# Patient Record
Sex: MALE | Race: BLACK OR AFRICAN AMERICAN
[De-identification: names, ages, dates, MRNs, and addresses within clinical notes are randomized per-mention and may not be internally consistent; named-entity substitution may affect disease eponyms.]

---

## 2017-11-30 ENCOUNTER — HOSPITAL ENCOUNTER (EMERGENCY)
Dept: HOSPITAL 62 - ER | Age: 27
Discharge: HOME | End: 2017-11-30
Payer: SELF-PAY

## 2017-11-30 VITALS — DIASTOLIC BLOOD PRESSURE: 84 MMHG | SYSTOLIC BLOOD PRESSURE: 126 MMHG

## 2017-11-30 DIAGNOSIS — N50.82: ICD-10-CM

## 2017-11-30 DIAGNOSIS — A64: ICD-10-CM

## 2017-11-30 DIAGNOSIS — F17.200: ICD-10-CM

## 2017-11-30 DIAGNOSIS — R36.9: ICD-10-CM

## 2017-11-30 DIAGNOSIS — N45.1: Primary | ICD-10-CM

## 2017-11-30 DIAGNOSIS — N50.89: ICD-10-CM

## 2017-11-30 PROCEDURE — 76870 US EXAM SCROTUM: CPT

## 2017-11-30 PROCEDURE — 93976 VASCULAR STUDY: CPT

## 2017-11-30 PROCEDURE — 96372 THER/PROPH/DIAG INJ SC/IM: CPT

## 2017-11-30 PROCEDURE — 99283 EMERGENCY DEPT VISIT LOW MDM: CPT

## 2017-11-30 NOTE — RADIOLOGY REPORT (SQ)
EXAM DESCRIPTION:  U/S SCROTUM W/DOPPLER



COMPLETED DATE/TIME:  11/30/2017 4:01 am



REASON FOR STUDY:  scrotal pain



COMPARISON:  None.



TECHNIQUE:  Static and realtime gray scale imaging of the scrotum and testes.  Selected color Doppler
 and spectral images recorded to document blood flow.



LIMITATIONS:  None.



FINDINGS:  RIGHT:

TESTICLE: Measure 4.0 x 2.6 x 2.2 cm. Normal echotexture.  Normal blood flow.

EPIDIDYMIS: Measures 1.2 x 1.3 x 0.8 cm.  The epididymis appears edematous with heterogeneous echotex
ture and increased vascular flow.

HYDROCELE OR VARICOCELE: Moderate hydrocele with mobile debris.  No varicocele.

HERNIA OR EXTRA-TESTICULAR MASS: No.

LEFT:

TESTICLE: Measures 3.7 x 2.3 x 1.8 cm. Normal echotexture.  Normal blood flow. There is a 2 mm anecho
ic area at the midpole of the left testicle with no internal vascular flow, may represent a small cys
t.

EPIDIDYMIS: Measures 1.2 x 1.1 x 1.0 cm.  There is a 6 mm cyst at the epididymal head.

HYDROCELE OR VARICOCELE: No.

HERNIA OR EXTRA-TESTICULAR MASS: No.



IMPRESSION:  1. No sonographic evidence for testicular torsion.  2 mm cyst at the left testicle.

2. Heterogeneous, edematous right epididymis with increased vascularity, suggestive of acute epididym
itis.

3. Moderate right-sided hydrocele with mobile debris.

4.  A 6 mm cyst at the left epididymal head.



TECHNICAL DOCUMENTATION:  JOB ID:  [id number]

[id number]

 2011 [employer]- All Rights Reserved

## 2017-11-30 NOTE — ER DOCUMENT REPORT
ED General





- General


Chief Complaint: Scrotal Pain, Acute Onset


Stated Complaint: PAIN SCROTUM AREA


Time Seen by Provider: 11/30/17 02:53


Notes: 





Patient is a 27-year-old male who presents with complaint of concern for sexual 

transmitted disease.  He denies any fevers or vomiting.  He started noticing 

symptoms 3 days ago when he had some penile discharge.  Since then he has 

developed some pain in the small and swelling to the right side of his scrotum.

  He says his left testicle is nonpainful.  He says he has had STDs in the past 

and this had similar presentation.  No history of torsion.  No history of 

trauma to his testicle. No other complaints at this time.


TRAVEL OUTSIDE OF THE [place] IN LAST 30 DAYS: No





- Related Data


Allergies/Adverse Reactions: 


 





No Known Allergies Allergy (Verified 11/30/17 03:07)


 











Past Medical History





- Social History


Smoking Status: Current Every Day Smoker


Chew tobacco use (# tins/day): No


Frequency of alcohol use: None


Drug Abuse: None


Family History: Reviewed & Not Pertinent


Patient has suicidal ideation: No


Patient has homicidal ideation: No


Renal/ Medical History: Denies: Hx Peritoneal Dialysis





Review of Systems





- Review of Systems


Notes: 





My Normal Review Basic





REVIEW OF SYSTEMS:


CONSTITUTIONAL :  Denies fever,  chills, or sweats.  Denies recent illness.


RESPIRATORY:  Denies cough, cold, or chest congestion.  Denies shortness of 

breath, difficulty breathing, or wheezing.


GASTROINTESTINAL:  Denies abdominal pain.  Denies nausea, vomiting, or 

diarrhea.  


Genital: Penile discharge and right scrotal pain


MUSCULOSKELETAL:  Denies neck or back pain or joint pain or swelling.


SKIN:   Denies rash or skin lesions.


NEUROLOGICAL:  Denies altered mental status or loss of consciousness. 


ALL OTHER SYSTEMS REVIEWED AND NEGATIVE.





Physical Exam





- Vital signs


Vitals: 


 











Temp Pulse Resp BP Pulse Ox


 


 98.4 F   66   18   135/84 H  99 


 


 11/30/17 02:41  11/30/17 02:41  11/30/17 02:41  11/30/17 02:41  11/30/17 02:41














- Notes


Notes: 





General Appearance: Well nourished, alert, cooperative, no acute distress, no 

obvious discomfort.


Vitals: reviewed, See vital signs table.


Eyes: PERRL, EOMI, Conjuctiva clear


Genital: Patient does have some discharge coming from the urethra.  Patient 

does not have any obvious swelling to the testicles on exam.  He does have some 

pain to palpation of the right testicle. Left testicle is not tender.


Skin: warm, dry, appropriate color, no rash


Neuro: speech clear, oriented x 3, normal affect, responds appropriately to 

questions.





Course





- Re-evaluation


Re-evalutation: 





11/30/17 04:50


Patient's exam and ultrasound is consistent with epididymitis most likely 

related to sexually transmitted disease.  Be given a shot of Rocephin placed on 

doxycycline.  I informed him to report to all sexual partners that need to be 

tested and treated.  Encouraged him return to ER immediately if he has 

increased swelling, pain, or fevers.  Patient agrees with plan and will be 

discharged home.





Dictation of this chart was performed using voice recognition software; 

therefore, there may be some unintended grammatical errors.





- Vital Signs


Vital signs: 


 











Temp Pulse Resp BP Pulse Ox


 


 98.4 F   66   18   135/84 H  99 


 


 11/30/17 02:41  11/30/17 02:41  11/30/17 02:41  11/30/17 02:41  11/30/17 02:41














Discharge





- Discharge


Clinical Impression: 


 Epididymitis, Sexually transmitted disease





Condition: Good


Disposition: HOME, SELF-CARE


Additional Instructions: 


Please take the antibiotics as prescribed. Please return to the ER if you have 

fevers, worsening pain or swelling of your testicle, or if you feel unwell. 

Please return to the ER for reevaluation if you continue to have any discharge 

or symptoms after finishing the antibiotics. Please contact your sexual 

partners and encourage them to be tested and treated. Do not have sex until 

your antibiotic course is complete and you are no longer having symptoms.


Prescriptions: 


Doxycycline Hyclate 100 mg PO BID #20 capsule

## 2020-08-02 ENCOUNTER — HOSPITAL ENCOUNTER (EMERGENCY)
Dept: HOSPITAL 62 - ER | Age: 30
LOS: 1 days | Discharge: HOME | End: 2020-08-03
Payer: SELF-PAY

## 2020-08-02 DIAGNOSIS — S02.2XXB: Primary | ICD-10-CM

## 2020-08-02 DIAGNOSIS — F10.120: ICD-10-CM
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
## 2020-08-02 LAB
ADD MANUAL DIFF: NO
ALBUMIN SERPL-MCNC: 4 G/DL (ref 3.5–5)
ALP SERPL-CCNC: 38 U/L (ref 38–126)
ANION GAP SERPL CALC-SCNC: 8 MMOL/L (ref 5–19)
APPEARANCE UR: (no result)
APTT PPP: YELLOW S
AST SERPL-CCNC: 27 U/L (ref 17–59)
BARBITURATES UR QL SCN: NEGATIVE
BASOPHILS # BLD AUTO: 0 10^3/UL (ref 0–0.2)
BASOPHILS NFR BLD AUTO: 0.4 % (ref 0–2)
BILIRUB DIRECT SERPL-MCNC: 0 MG/DL (ref 0–0.4)
BILIRUB SERPL-MCNC: 0.4 MG/DL (ref 0.2–1.3)
BILIRUB UR QL STRIP: NEGATIVE
BUN SERPL-MCNC: 11 MG/DL (ref 7–20)
CALCIUM: 8.4 MG/DL (ref 8.4–10.2)
CHLORIDE SERPL-SCNC: 109 MMOL/L (ref 98–107)
CO2 SERPL-SCNC: 21 MMOL/L (ref 22–30)
EOSINOPHIL # BLD AUTO: 0.2 10^3/UL (ref 0–0.6)
EOSINOPHIL NFR BLD AUTO: 3.4 % (ref 0–6)
ERYTHROCYTE [DISTWIDTH] IN BLOOD BY AUTOMATED COUNT: 14.7 % (ref 11.5–14)
ETHANOL SERPL-MCNC: 246 MG/DL
GLUCOSE SERPL-MCNC: 84 MG/DL (ref 75–110)
GLUCOSE UR STRIP-MCNC: NEGATIVE MG/DL
HCT VFR BLD CALC: 41.4 % (ref 37.9–51)
HGB BLD-MCNC: 13.2 G/DL (ref 13.5–17)
KETONES UR STRIP-MCNC: NEGATIVE MG/DL
LYMPHOCYTES # BLD AUTO: 1.7 10^3/UL (ref 0.5–4.7)
LYMPHOCYTES NFR BLD AUTO: 31.9 % (ref 13–45)
MCH RBC QN AUTO: 23.6 PG (ref 27–33.4)
MCHC RBC AUTO-ENTMCNC: 32 G/DL (ref 32–36)
MCV RBC AUTO: 74 FL (ref 80–97)
METHADONE UR QL SCN: NEGATIVE
MONOCYTES # BLD AUTO: 0.5 10^3/UL (ref 0.1–1.4)
MONOCYTES NFR BLD AUTO: 8.6 % (ref 3–13)
NEUTROPHILS # BLD AUTO: 3 10^3/UL (ref 1.7–8.2)
NEUTS SEG NFR BLD AUTO: 55.7 % (ref 42–78)
PCP UR QL SCN: NEGATIVE
PH UR STRIP: 5 [PH] (ref 5–9)
PLATELET # BLD: 191 10^3/UL (ref 150–450)
POTASSIUM SERPL-SCNC: 3.4 MMOL/L (ref 3.6–5)
PROT SERPL-MCNC: 6.6 G/DL (ref 6.3–8.2)
PROT UR STRIP-MCNC: 30 MG/DL
RBC # BLD AUTO: 5.61 10^6/UL (ref 4.35–5.55)
SP GR UR STRIP: 1.02
TOTAL CELLS COUNTED % (AUTO): 100 %
URINE AMPHETAMINES SCREEN: NEGATIVE
URINE BENZODIAZEPINES SCREEN: NEGATIVE
URINE COCAINE SCREEN: NEGATIVE
URINE MARIJUANA (THC) SCREEN: (no result)
UROBILINOGEN UR-MCNC: NEGATIVE MG/DL (ref ?–2)
WBC # BLD AUTO: 5.4 10^3/UL (ref 4–10.5)

## 2020-08-02 PROCEDURE — 85025 COMPLETE CBC W/AUTO DIFF WBC: CPT

## 2020-08-02 PROCEDURE — 72125 CT NECK SPINE W/O DYE: CPT

## 2020-08-02 PROCEDURE — 80053 COMPREHEN METABOLIC PANEL: CPT

## 2020-08-02 PROCEDURE — 70486 CT MAXILLOFACIAL W/O DYE: CPT

## 2020-08-02 PROCEDURE — 36415 COLL VENOUS BLD VENIPUNCTURE: CPT

## 2020-08-02 PROCEDURE — 99284 EMERGENCY DEPT VISIT MOD MDM: CPT

## 2020-08-02 PROCEDURE — 80307 DRUG TEST PRSMV CHEM ANLYZR: CPT

## 2020-08-02 PROCEDURE — 81001 URINALYSIS AUTO W/SCOPE: CPT

## 2020-08-02 PROCEDURE — 70450 CT HEAD/BRAIN W/O DYE: CPT

## 2020-08-02 NOTE — RADIOLOGY REPORT (SQ)
CT BRAIN, CERVICAL SPINE, AND FACIAL BONES



HISTORY: Trauma.



COMPARISON: None.



TECHNIQUE: CT scan of the brain, cervical spine, and facial bones

was performed without IV contrast. This exam was performed

according to our departmental dose-optimization program, which

includes automated exposure control, adjustment of the mA and/or

kV according to patient size and/or use of iterative

reconstruction technique.



FINDINGS: 



BRAIN:



The ventricles, cisterns, and sulci are age-appropriate. No

evidence of acute infarction, intracranial hemorrhage,

extra-axial fluid collection, or midline shift. No depressed

skull fracture.



CERVICAL SPINE:



No acute cervical fracture or prevertebral soft tissue swelling

is seen. There is straightening of the normal cervical lordosis,

which may be due to cervical collar, muscle spasm, or patient

positioning. The facet joints and disc spaces are preserved. No

advanced canal stenosis is identified. Mild ossification of the

anterior longitudinal ligament at C4-C5.



FACIAL BONES:



There is a mildly displaced fracture of the left nasal bone with

overlying soft tissue swelling and laceration. No air-fluid

levels are seen in the paranasal sinuses. The mastoid air cells

are clear. No retrobulbar mass or hematoma is identified.



IMPRESSION:  



1.  Mildly displaced fracture of the left nasal bone with

overlying soft tissue swelling and laceration.

2.  No acute intracranial hemorrhage or cervical fracture. Patent was informed of her results via phone. Patient verbalized understanding.  Patient was scheduled for her CT scan.

## 2020-08-02 NOTE — ER DOCUMENT REPORT
ED Fall





- General


Chief Complaint: Fall Injury


Stated Complaint: ETOH/FACIAL LACERATION


Time Seen by Provider: 20 21:34


Notes: 





30 yo male presents to the emergency department with facial injuries and poorly 

responsive.  EMS was called patient was placed in a c-collar, he was given 1 mg 

of Lorazepam due to combativeness.  In the emergency department he is on 

responsive to verbal and painful stimuli.  The transport called notes EtOH abuse

and injury to face.


TRAVEL OUTSIDE OF THE [place] IN LAST 30 DAYS: No





- Related data


Allergies/Adverse Reactions: 


                                        





No Known Allergies Allergy (Verified 17 03:07)


   











Past Medical History





- Social History


Smoking Status: Unknown if Ever Smoked


Frequency of alcohol use: Heavy


Drug Abuse: Other


Family History: Reviewed & Not Pertinent


Patient has homicidal ideation: No


Renal/ Medical History: Denies: Hx Peritoneal Dialysis


Past Surgical History: Reports: Hx Orthopedic Surgery - bilateral knees





Review of Systems





- Review of Systems


-: Yes ROS unobtainable due to patient's medical condition - Patient is obtunded

and unable to answer questions.





Physical Exam





- Vital signs


Vitals: 


                                        











Temp


 


 97.7 F 


 


 20 21:32














- Notes


Notes: 





PHYSICAL EXAMINATION:


 


Physical Exam:


General: Obtunded 29-year-old male with obvious facial injuries and c-collar in 

place.


HEENT: + Swelling at the nasal bridge with face 1 cm laceration on the left side

of the nasal bridge, pupils equal round and reactive to light, MM moist,nares 

clear, oropharynx clear, airway patent


Neck: supple, no adenopathy, no masses.  Good range of motion


Lungs: clear, no wheezing, no rales no rhonchi


CVS: Regular rate and rhythm no murmur gallop or rub


Abdomen: Soft, active, nontender, no masses, no hepatosplenomegaly


Ext:   No edema, clubbing or cyanosis.


Neuro: No response to painful stimulus, verbal stimulus, airway intactpatent


Skin: Intact no open lesions, no rash











Course





- Vital Signs


Vital signs: 


                                        











Temp Pulse Resp BP Pulse Ox


 


 97.7 F      17   104/63   100 


 


 20 21:32     20 22:40  20 22:40  20 22:40














- Laboratory


Result Diagrams: 


                                 20 21:38





                                 20 21:38


Laboratory results interpreted by me: 


                                        











  20





  21:38 21:38 21:38


 


RBC  5.61 H  


 


Hgb  13.2 L  


 


MCV  74 L  


 


MCH  23.6 L  


 


RDW  14.7 H  


 


Potassium   3.4 L 


 


Chloride   109 H 


 


Carbon Dioxide   21 L 


 


Urine Protein    30 H








I have reviewed laboratory data and used this information for the treatment 

decisions regarding the patient.





- Diagnostic Test


Radiology reviewed: Image reviewed, Reports reviewed


Radiology results interpreted by me: 





20 23:16


CT head: No intracranial hemorrhage, no soft tissue swelling, no bony calvarium 

fracture





CT cervical spine: Good alignment, no fracture seen





CT facial bones: Mildly displaced nasal bone fracture, soft tissue swelling.





Procedures





- Laceration/Wound Repair


  ** Left Face


Time completed: 22:20


Wound length (cm): 1


Wound's Depth, Shape: Superficial


Laceration pre-procedure: Other - Area was cleaned with saline


Wound explored: Clean


Wound Repaired With: Dermabond


Complications: No





Discharge





- Discharge


Clinical Impression: 


Alcohol intoxication


Qualifiers:


 Complication of substance-induced condition: uncomplicated Qualified Code(s): 

F10.920 - Alcohol use, unspecified with intoxication, uncomplicated





Nasal bone fracture


Qualifiers:


 Encounter type: initial encounter Fracture type: open Qualified Code(s): 

S02.2XXB - Fracture of nasal bones, initial encounter for open fracture





Simple laceration of face


Qualifiers:


 Encounter type: initial encounter Qualified Code(s): S01.81XA - Laceration 

without foreign body of other part of head, initial encounter





Fall


Qualifiers:


 Encounter type: initial encounter Qualified Code(s): W19.XXXA - Unspecified fal

l, initial encounter





Condition: Good


Disposition: HOME, SELF-CARE


Instructions:  Prophylactic Antibiotic (OM), Acute Alcohol Intoxication (OM), 

Tetanus Immunization Given ([facility])


Additional Instructions: 


You were seen in the emergency department tonight with alcohol intoxication and 

a fall which caused a nasal bone fracture.  The bone is mildly displaced, and 

you sustained a small laceration on the left side of your face.  The laceration 

was repaired with Dermabond which is a tissue adhesive.  Do not apply ointment 

to this area.  You were given antibiotics because of the nasal fracture in the 

open laceration.  Take the medications as prescribed.  Continue to apply a cold 

pack to the area to reduce swelling.  If you are having any difficulty please 

follow-up with a ear nose and throat doctor regarding the nasal bone fracture.  

Many times no intervention is needed.  You may use Tylenol and ibuprofen for 

pain management.





If you are having complications or other difficulties you may return to the 

emergency department.








HOME CARE INSTRUCTIONS & INFORMATION:  Thank you for choosing us for your 

medical needs. We hope you're satisfied with the care you received.  After you 

leave, you must properly care for your problem and, at the same time, observe 

its progress.  Any condition can change.  Some illnesses can change rapidly over

hours or days.  If your condition worsens, return to the Emergency Department or

see your physician promptly.





ABOUT YOUR X-RAYS AND EKG'S:   If you had an EKG or X-rays taken, they have been

read by the Emergency Physician. The X-rays and EKG's will also be read by a 

Radiologist or Cardiologist within 24 hours.  If discrepancies are noted, you 

will be notified by telephone.  Please be certain the ED has a correct telephone

number & address where you can be reached.  Also, realize that some fractures or

abnormalities do not show up on initial X-rays.  If your symptoms continue, see 

your physician.





ABOUT YOUR LABORATORY TEST:   If you had laboratory tests, the results have been

reviewed by the Emergency Physician.  Some test results (for example cultures) 

may not be available for several days.  You will be contacted if any test result

shows you need additional treatment.  Please be certain the ED has a correct 

telephone number and address where you can be reached.





ABOUT YOUR MEDICATIONS:  You will receive instructions on how to take your 

medicine on the prescription label you receive.  Additional information may be 

provided by the Pharmacy.  If you have questions afterwards, call the ED for 

clarification or further instructions.  Some prescribed medications may cause 

drowsiness.  Do not perform tasks such as driving a car or operating machinery 

without consulting your Pharmacist.  If you feel you need a refill of pain 

medication, your condition will need re-evaluation.  Please do not call for a 

refill of any medication.





ABOUT YOUR SIGNATURE:   Signature of this document acknowledges to followin. Understanding that you received emergency treatment and that you may be 

released before al medical problems are known or treated. Please be certain   

the ED has a correct phone number & address where you can be reached.


   2. Acknowledgement that you will arrange for follow-up care as recommended.


   3. Authorization for the Emergency Physician to provide information to your 

follow-up Physician in order to maximize your care.





AT ANY TIME, IF YOUR SYMPTOMS CHANGE SIGNIFICANTLY OR WORSEN OR YOU DEVELOP NEW 

SYMPTOMS, RETURN TO THE EMERGENCY DEPARTMENT IMMEDIATELY FOR RE-EVALUATION.





OUR GOAL IS TO PROVIDE EXCELLENT MEDICAL CARE!





WE HOPE THAT WE HAVE MET YOUR EXPECTATIONS DURING YOUR EMERGENCY DEPARTMENT 

VISIT AND THAT YOU FEEL YOU HAVE RECEIVED EXCELLENT CARE!











Prescriptions: 


Cephalexin Monohydrate [Keflex 500 mg Capsule] 500 mg PO Q8 10 Days #30 capsule

## 2020-08-02 NOTE — RADIOLOGY REPORT (SQ)
CT BRAIN, CERVICAL SPINE, AND FACIAL BONES



HISTORY: Trauma.



COMPARISON: None.



TECHNIQUE: CT scan of the brain, cervical spine, and facial bones

was performed without IV contrast. This exam was performed

according to our departmental dose-optimization program, which

includes automated exposure control, adjustment of the mA and/or

kV according to patient size and/or use of iterative

reconstruction technique.



FINDINGS: 



BRAIN:



The ventricles, cisterns, and sulci are age-appropriate. No

evidence of acute infarction, intracranial hemorrhage,

extra-axial fluid collection, or midline shift. No depressed

skull fracture.



CERVICAL SPINE:



No acute cervical fracture or prevertebral soft tissue swelling

is seen. There is straightening of the normal cervical lordosis,

which may be due to cervical collar, muscle spasm, or patient

positioning. The facet joints and disc spaces are preserved. No

advanced canal stenosis is identified. Mild ossification of the

anterior longitudinal ligament at C4-C5.



FACIAL BONES:



There is a mildly displaced fracture of the left nasal bone with

overlying soft tissue swelling and laceration. No air-fluid

levels are seen in the paranasal sinuses. The mastoid air cells

are clear. No retrobulbar mass or hematoma is identified.



IMPRESSION:  



1.  Mildly displaced fracture of the left nasal bone with

overlying soft tissue swelling and laceration.

2.  No acute intracranial hemorrhage or cervical fracture.

## 2020-08-03 VITALS — DIASTOLIC BLOOD PRESSURE: 62 MMHG | SYSTOLIC BLOOD PRESSURE: 100 MMHG
